# Patient Record
Sex: FEMALE | Race: WHITE | Employment: PART TIME | URBAN - METROPOLITAN AREA
[De-identification: names, ages, dates, MRNs, and addresses within clinical notes are randomized per-mention and may not be internally consistent; named-entity substitution may affect disease eponyms.]

---

## 2019-01-29 ENCOUNTER — OFFICE VISIT (OUTPATIENT)
Dept: ORTHOPEDIC SURGERY | Age: 29
End: 2019-01-29

## 2019-01-29 VITALS
DIASTOLIC BLOOD PRESSURE: 90 MMHG | SYSTOLIC BLOOD PRESSURE: 114 MMHG | HEIGHT: 69 IN | WEIGHT: 270 LBS | HEART RATE: 74 BPM | OXYGEN SATURATION: 97 % | BODY MASS INDEX: 39.99 KG/M2 | TEMPERATURE: 97.6 F

## 2019-01-29 DIAGNOSIS — M77.41 METATARSALGIA OF BOTH FEET: Primary | ICD-10-CM

## 2019-01-29 DIAGNOSIS — M25.571 CHRONIC PAIN OF RIGHT ANKLE: ICD-10-CM

## 2019-01-29 DIAGNOSIS — M77.42 METATARSALGIA OF BOTH FEET: Primary | ICD-10-CM

## 2019-01-29 DIAGNOSIS — M79.671 RIGHT FOOT PAIN: ICD-10-CM

## 2019-01-29 DIAGNOSIS — M25.572 CHRONIC PAIN OF LEFT ANKLE: ICD-10-CM

## 2019-01-29 DIAGNOSIS — G89.29 CHRONIC PAIN OF RIGHT ANKLE: ICD-10-CM

## 2019-01-29 DIAGNOSIS — G89.29 CHRONIC PAIN OF LEFT ANKLE: ICD-10-CM

## 2019-01-29 DIAGNOSIS — M79.672 LEFT FOOT PAIN: ICD-10-CM

## 2019-01-29 PROBLEM — E66.01 SEVERE OBESITY (HCC): Status: ACTIVE | Noted: 2019-01-29

## 2019-01-29 NOTE — PATIENT INSTRUCTIONS
Please follow up in 4 weeks. You are advised to contact us if your condition worsens. You have been provided with an order for durable medical equipment that you may  at an outside facility as our office does not carry the equipment you need. You may pick it up at any medical supply company you like. Listed below are a few different locations for your convenience:    [address]  Phone: [phone]  [place]:   [address]  [place], [address] [place]:  [address], [address]  [place]:  [facility],[address] [place], [address]      Look into [employer] at [facility] [address]  Address: [address] [place] [address], [place], [address]  Phone: [phone]: [facility] (say \"met--tar-SAL-catalino-\") is pain in the ball of the foot. It sometimes spreads to the toes. The ball of the foot is the bottom of the foot, where the toes join the foot. While walking might be very painful, the pain is usually not a sign of a serious or permanent problem. But any pain can affect your life, so it is important that you treat it. Pain in this area can be caused by many things. For example, you may have this pain if you stand or walk a lot or wear tight shoes or high heels. Your pain might be caused by inflammation of a joint (capsulitis). It is most common in the joint at the base of the second toe, near the ball of the foot. Capsulitis happens when ligaments that go around the joint become inflamed. The joint may be swollen. It may feel like there is a small rock under it. You may have had an X-ray if your doctor wanted to make sure a more serious problem is not causing your pain. Treatment may consist of home care, such as rest, wearing different shoes, and taking over-the-counter pain medicines.  It can take months for the pain to go away. If the ligaments around a joint are torn, or if a toe has started to slant toward the toe next to it, you may need surgery. Follow-up care is a key part of your treatment and safety. Be sure to make and go to all appointments, and call your doctor if you are having problems. It's also a good idea to know your test results and keep a list of the medicines you take. How can you care for yourself at home? · Rest your foot. If an activity is causing the pain, find another one to do that does not put so much pressure on your foot. · Put ice or a cold pack on your foot when it hurts or after you've done something that usually causes pain. Do this for 10 to 20 minutes at a time. Put a thin cloth between the ice and your skin. · Take an over-the-counter pain medicine, such as acetaminophen (Tylenol), ibuprofen (Advil, Motrin), or naproxen (Aleve). Be safe with medicines. Read and follow all instructions on the label. · Do not take two or more pain medicines at the same time unless the doctor told you to. Many pain medicines have acetaminophen, which is Tylenol. Too much acetaminophen (Tylenol) can be harmful. · Wear roomy, comfortable shoes. · If your doctor recommends it, use special pads to relieve the pressure on your foot. The pads may fit into your shoes, or they may stick to the soles of your feet. · Ask your doctor about using orthotic shoe devices. These are molded pieces of rubber, leather, metal, plastic, or other synthetic material that are inserted into a shoe. · Wear shoes with good arch support. · Try not to wear high heels or narrow shoes. · Follow your doctor's or physical therapist's directions for exercise. When should you call for help? Watch closely for changes in your health, and be sure to contact your doctor if:    · You have new or worse symptoms.     · You do not get better as expected. Where can you learn more? Go to http://neville.info/.   Enter P413 in the search box to learn more about \"Metatarsalgia: Care Instructions. \"  Current as of: September 20, 2018  Content Version: 11.9  © 9465-6364 [employer], [employer] Care instructions adapted under license by [employer] (which disclaims liability or warranty for this information). If you have questions about a medical condition or this instruction, always ask your healthcare professional. [place] [address] any warranty or liability for your use of this information.

## 2019-01-29 NOTE — PROGRESS NOTES
AMBULATORY PROGRESS NOTE      Patient: Anil Frank             MRN: [id number]     SSN: [id number] Body mass index is 39.87 kg/m². YOB: 1990     AGE: 29 y.o. EX: female    PCP: No primary care provider on file. IMPRESSION/DIAGNOSIS AND TREATMENT PLAN     DIAGNOSES  1. Metatarsalgia of both feet    2. Chronic pain of right ankle    3. Right foot pain    4. Chronic pain of left ankle    5. Left foot pain        Orders Placed This Encounter    AMB SUPPLY ORDER    [78797] Ankle 2V    [81389] Foot Min 3V    [65452] Ankle 2V    [83456] Foot Min 3V    ammonium lactate (LAC-HYDRIN FIVE) 5 % lotion      Anil Frank understands her diagnoses and the proposed plan. 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 She has some bilateral metatarsus adductus, overloaded to her forefoot. Recommendations are listed as below. Plan:    1) Lac-Hydrin 5% cream: BID; 1 bottle, 0 refills. 2) DME Order: Custom bilaterally trilaminar orthotic inserts with lateral posting and metatarsal pads with distinct depressions to offload the metatarsal heads (TPC). 3) Continue activity modification as directed. RTO - 4 weeks     HPI AND EXAMINATION     Emmy Holden IS A 29 y.o. female who presents to my outpatient office complaining of bilateral foot pain. Ms. Harry Councilman states that she has been experiencing pain in her plantar bilateral feet for some time. She reports that she has painful calluses on the bottom of her feet, as well, over her metatarsal heads. She notes that these have been there for a long time. She reports that she cannot walk \"flat-footed\" and finds that she must walk inverted, on the lateral sides of her feet, to avoid her foot pain. She notes that she cannot walk barefoot, as well. The patient denies any recent falls, twists, or trauma. XR imaging has been reviewed with the patient.  She was referred to me by Dr. Dora Wong, who follows her for knee issues. Visit Vitals  /90   Pulse 74   Temp 97.6 °F (36.4 °C) (Oral)   Ht 5' 9\" (1.753 m)   Wt 270 lb (122.5 kg)   SpO2 97%   BMI 39.87 kg/m²      Appearance: Alert, well appearing and pleasant patient who is in no distress, oriented to person, place/time, and who follows commands. This patient is accompanied in the examination room by her children. Dementia: no dementia  Psychiatric: Affect and mood are appropriate. Patient arrives to office via: without assistive device  HEENT: Head normocephalic & atraumatic. Both pupils are round, non icteric sclera   Eye: EOM are intact and sclera are clear    Neck: ROM WNL and JVD neck is not present     Hearings Intact, does not require hearing aid device  Respiratory: Breathing is unlabored without accessory chest muscle use  Cardiovascular/Peripheral Vascular: Normal Pulses to each hand and foot    ANKLE/FOOT right    Gait: Antalgic  Tenderness: Mild tenderness to the plantar fascia. Mild tenderness to the calluses over the metatarsal heads  Cutaneous: 2 cm x 2 cm area of thickened skin callus over the 1st metatarsal head. Calluses over the 3rd and 5th metatarsals heads. Joint Motion: WNL. Joint / Tendon Stability: No Ankle or Subtalar instability or joint laxity. No peroneal sublux ability or dislocation  Alignment: Mild pes cavus with mild metatarsus adductus. Neuro Motor/Sensory: NL/NL. Vascular: NL foot/ankle pulses. Lymphatics: No extremity lymphedema, No calf swelling, no tenderness to calf muscles. ANKLE/FOOT left    Tenderness: Mild tenderness to the calluses over the metatarsal heads. Cutaneous: Multiple calluses over the 1st, 2nd, and 5th metatarsal heads. Joint Motion: WNL. Joint / Tendon Stability: No Ankle or Subtalar instability or joint laxity. No peroneal sublux ability or dislocation  Alignment: Mild pes cavus with mild metatarsus adductus.    Neuro Motor/Sensory: NL/NL. Vascular: NL foot/ankle pulses. Lymphatics: No extremity lymphedema, No calf swelling, no tenderness to calf muscles. CHART REVIEW     History reviewed. No pertinent past medical history. Current Outpatient Medications   Medication Sig    ammonium lactate (LAC-HYDRIN FIVE) 5 % lotion Apply  to affected area two (2) times a day. Apply to affected area. No current facility-administered medications for this visit. Not on File  History reviewed. No pertinent surgical history. Social History     Occupational History    Not on file   Tobacco Use    Smoking status: Current Every Day Smoker    Smokeless tobacco: Never Used   Substance and Sexual Activity    Alcohol use: No     Frequency: Never    Drug use: Not on file    Sexual activity: Not on file     History reviewed. No pertinent family history. REVIEW OF SYSTEMS : 1/29/2019  ALL BELOW ARE Negative except : SEE HPI     CONSTITUTIONAL: denies chills, fatigue, fever, weight change   PSYCH: denies anxiety, depression, irritability or mood swings   ENT: denies - headaches, hearing change, nasal congestion, oral lesions, or sore throat   HEM/ONC denies - bleeding problems, bruising, pallor or swollen lymph nodes   ENDO: denies hot flashes, polydipsia/polyuria or temperature intolerance   RESP: denies - cough, shortness of breath or wheezing   CV: denies - chest pain, edema or palpitations, BROCK  GI: denies - abdominal pain, change in bowel habits, constipation, diarrhea or nausea/vomiting   : denies - dysuria, hematuria, incontinence, pelvic pain   MSK: denies  - See HPI.   NEURO: denies - confusion, headaches, seizures or weakness   DERM: denies - dry skin, hair changes, rash or skin lesion changes  VASCULAR: Peripheral Vascular: No calf pain, vascular vein tenderness to calf pain              No calf throbbing, posterior knee throbbing pain     DIAGNOSTIC IMAGING       FOOT X RAYS 3 VIEWS Bilateral   1/29/2019    NON WEIGHT BEARING    X RAYS AT [address] [place] [address]  1/29/2019    {Bilateral FOOT:     Bones: No fractures or dislocations. No focal osteolytic or osteoblastic process    Bone Spurs: No significant bone spurs     Alignment: mild Varus Hindfoot alignment   Pes Cavus alignment present: metatarsus adductus, hindfoot varus. Pes Cavus alignment: Mild  Joint: No  OA changes present to: Soft Tissues: Mild swelling dorsal midfoot   No ankle joint effusion in lateral projection. Mineralization: suggests Normal Bone       I have personally reviewed the results of the above study. The interpretation of this study is my professional opinion  Written by [facility], as dictated by Dr. Gene Macedo. I, Dr. Gene Macedo, confirm that all documentation is accurate.